# Patient Record
Sex: MALE | Race: BLACK OR AFRICAN AMERICAN | NOT HISPANIC OR LATINO | Employment: OTHER | URBAN - METROPOLITAN AREA
[De-identification: names, ages, dates, MRNs, and addresses within clinical notes are randomized per-mention and may not be internally consistent; named-entity substitution may affect disease eponyms.]

---

## 2022-11-05 PROBLEM — M25.572 ACUTE LEFT ANKLE PAIN: Status: ACTIVE | Noted: 2022-11-05

## 2023-12-19 ENCOUNTER — OFFICE VISIT (OUTPATIENT)
Dept: URGENT CARE | Facility: CLINIC | Age: 23
End: 2023-12-19
Payer: MEDICAID

## 2023-12-19 VITALS
RESPIRATION RATE: 19 BRPM | BODY MASS INDEX: 25.2 KG/M2 | DIASTOLIC BLOOD PRESSURE: 79 MMHG | OXYGEN SATURATION: 99 % | TEMPERATURE: 98 F | WEIGHT: 176 LBS | HEART RATE: 74 BPM | HEIGHT: 70 IN | SYSTOLIC BLOOD PRESSURE: 132 MMHG

## 2023-12-19 DIAGNOSIS — R21 PENILE RASH: Primary | ICD-10-CM

## 2023-12-19 LAB
C TRACH DNA SPEC QL NAA+PROBE: NOT DETECTED
HAV IGM SERPL QL IA: NORMAL
HBV CORE IGM SERPL QL IA: NORMAL
HBV SURFACE AG SERPL QL IA: NORMAL
HCV AB SERPL QL IA: NORMAL
HIV 1+2 AB+HIV1 P24 AG SERPL QL IA: NORMAL
N GONORRHOEA DNA SPEC QL NAA+PROBE: NOT DETECTED

## 2023-12-19 PROCEDURE — 87389 HIV-1 AG W/HIV-1&-2 AB AG IA: CPT

## 2023-12-19 PROCEDURE — 36415 COLL VENOUS BLD VENIPUNCTURE: CPT

## 2023-12-19 PROCEDURE — 80074 ACUTE HEPATITIS PANEL: CPT

## 2023-12-19 PROCEDURE — 86592 SYPHILIS TEST NON-TREP QUAL: CPT

## 2023-12-19 PROCEDURE — 87661 TRICHOMONAS VAGINALIS AMPLIF: CPT

## 2023-12-19 PROCEDURE — 87591 N.GONORRHOEAE DNA AMP PROB: CPT

## 2023-12-19 PROCEDURE — 99204 OFFICE O/P NEW MOD 45 MIN: CPT | Mod: S$GLB,,,

## 2023-12-19 PROCEDURE — 87491 CHLMYD TRACH DNA AMP PROBE: CPT

## 2023-12-19 PROCEDURE — 99204 PR OFFICE/OUTPT VISIT, NEW, LEVL IV, 45-59 MIN: ICD-10-PCS | Mod: S$GLB,,,

## 2023-12-19 NOTE — PATIENT INSTRUCTIONS
STD Screening  You were tested for STDs on today.  As far the STD testing, we will hold off on any medications until the labs result. We will phone in medication for you if needed.  If you were treated today, please take medication until completion unless told otherwise.  If you need more medication when the labs result, we will call you and phone them in for you.  If you do test positive for STDs you should be retested in about 3 months. Retest to ensure infection has cleared-there are infections that require more agressive treatment.   Increase condom use to prevent further occurance.    Notify sexual partners of the need for testing.    NO sexual intercourse for 7 days after treatment.   Today's testing will give no crediable information if you have unprotected sexual activities going forward.  Syphilis cases are rising!    Gonorrhea has RESISTANT strains which is why repeat testing after treatment is important.  Gonorrhea may be present in multiple sites from just ONE area of exposure.    For those who have high risk sexual behaviors and are on Truvada for PrEP- you have additional protection against HIV ONLY.    REMEMBER WEAR CONDOMS AND GET TESTED OFTEN.

## 2023-12-19 NOTE — PROGRESS NOTES
"Subjective:      Patient ID: Mart Floyd is a 23 y.o. male.    Vitals:  height is 5' 10" (1.778 m) and weight is 79.8 kg (176 lb). His temperature is 98.3 °F (36.8 °C). His blood pressure is 132/79 and his pulse is 74. His respiration is 19 and oxygen saturation is 99%.     Chief Complaint: Exposure to STD    Mart Floyd is a 23 y.o. male who presents for penile rash which onset 4 days ago. Patient denies any fever, chills, SOB, CP, abd pain, n/v/d. No discharge. No urinary changes. No flank pain. No hematuria. 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     Exposure to STD  The patient's primary symptoms include genital itching and genital lesions. The patient's pertinent negatives include no genital injury, pelvic pain, penile discharge, penile pain, priapism, scrotal swelling or testicular pain. This is a new problem. Episode onset: 5 days ago. The problem occurs constantly. The problem has been unchanged. The patient is experiencing no pain. Pertinent negatives include no abdominal pain, anorexia, chest pain, chills, constipation, coughing, diarrhea, discolored urine, dysuria, fever, flank pain, frequency, headaches, hematuria, hesitancy, joint pain, joint swelling, nausea, painful intercourse, rash, shortness of breath, sore throat, urgency, urinary retention or vomiting. Nothing aggravates the symptoms. He has tried nothing for the symptoms. The treatment provided no relief. He is sexually active. He inconsistently uses condoms. Yes, his partner has an STD. His past medical history is significant for gonorrhea. There is no history of BPH, chlamydia, cryptorchidism, erectile aid use, erectile dysfunction, a femoral hernia, herpes simplex, HIV, an inguinal hernia, kidney stones, prostatitis, sickle cell disease, syphilis or varicocele.       Constitution: Negative for chills and fever.   HENT:  Negative for sore throat.    Cardiovascular:  Negative for chest pain.   Respiratory:  Negative for cough and shortness of breath.  "   Gastrointestinal:  Negative for abdominal pain, nausea, vomiting, constipation and diarrhea.   Genitourinary:  Negative for dysuria, frequency, urgency, flank pain, penile discharge, penile pain, scrotal swelling, testicular pain and pelvic pain.   Skin:  Negative for rash.   Neurological:  Negative for headaches.      Objective:     Physical Exam   Constitutional: He is oriented to person, place, and time. He appears well-developed. No distress.   HENT:   Head: Normocephalic and atraumatic.   Ears:   Right Ear: External ear normal.   Left Ear: External ear normal.   Nose: Nose normal. No nasal deformity. No epistaxis.   Mouth/Throat: Oropharynx is clear and moist and mucous membranes are normal.   Eyes: Conjunctivae and lids are normal.   Neck: Trachea normal and phonation normal. Neck supple.   Cardiovascular: Normal rate, regular rhythm and normal heart sounds.   Pulmonary/Chest: Effort normal and breath sounds normal.   Abdominal: Normal appearance and bowel sounds are normal. He exhibits no distension. Soft. There is no abdominal tenderness.   Genitourinary:    Testes normal.   Uncircumcised. No penile tenderness. Penis exhibits no lesions. No discharge found.               Comments: Small flesh colored papule to penile shaft. No vesicles. No erythema. No discharge.      Musculoskeletal: Normal range of motion.         General: Normal range of motion.   Neurological: He is alert and oriented to person, place, and time. He has normal reflexes.   Skin: Skin is warm, dry, intact and not diaphoretic.   Psychiatric: His speech is normal and behavior is normal. Judgment and thought content normal.   Nursing note and vitals reviewed.chaperone present       Assessment:     1. Penile rash        Plan:       Penile rash  -     C. trachomatis/N. gonorrhoeae by AMP DNA [facility]; Urine  -     Trichomonas vaginalis, RNA, Qual, Urine  -     HIV 1/2 Ag/Ab (4th Gen); Future; Expected date: 12/19/2023  -     RPR; Future;  Expected date: 12/19/2023  -     HEPATITIS PANEL, ACUTE; Future; Expected date: 12/19/2023      Afebrile. VSS.  Rash not consistent with HSV.  NG/CT, trichomonas, HIV, RPR, and hepatitis panel ordered. Will notify patient with results in 3-5 days and tx as necessary.  Discussed HSV in length with patient.  Discussed importance of condoms and safe sex practices.  Discussed that he should not have sexual intercourse while being treated for STD and to use protection following treatment and culture results.   Patient verbalized understanding and has no further questions at this time. Discharge instructions reviewed with patient.   RTC as needed.

## 2023-12-20 LAB — RPR SER QL: NORMAL

## 2023-12-21 ENCOUNTER — TELEPHONE (OUTPATIENT)
Dept: URGENT CARE | Facility: CLINIC | Age: 23
End: 2023-12-21
Payer: MEDICAID

## 2023-12-21 LAB
SPECIMEN SOURCE: NORMAL
T VAGINALIS RRNA SPEC QL NAA+PROBE: NEGATIVE

## 2024-10-14 ENCOUNTER — TELEPHONE (OUTPATIENT)
Dept: PRIMARY CARE CLINIC | Facility: CLINIC | Age: 24
End: 2024-10-14
Payer: MEDICAID
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
## 2024-10-15 ENCOUNTER — OFFICE VISIT (OUTPATIENT)
Dept: PRIMARY CARE CLINIC | Facility: CLINIC | Age: 24
End: 2024-10-15
Payer: MEDICAID

## 2024-10-15 VITALS
SYSTOLIC BLOOD PRESSURE: 120 MMHG | HEART RATE: 82 BPM | OXYGEN SATURATION: 97 % | DIASTOLIC BLOOD PRESSURE: 88 MMHG | BODY MASS INDEX: 23.68 KG/M2 | WEIGHT: 165.38 LBS | TEMPERATURE: 98 F | HEIGHT: 70 IN

## 2024-10-15 DIAGNOSIS — M25.532 ACUTE PAIN OF LEFT WRIST: ICD-10-CM
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
## 2024-10-15 DIAGNOSIS — M25.571 ACUTE RIGHT ANKLE PAIN: ICD-10-CM

## 2024-10-15 DIAGNOSIS — S20.222D CONTUSION OF LEFT SIDE OF BACK, SUBSEQUENT ENCOUNTER: ICD-10-CM

## 2024-10-15 DIAGNOSIS — M25.561 ACUTE PAIN OF RIGHT KNEE: ICD-10-CM

## 2024-10-15 PROCEDURE — 99999 PR PBB SHADOW E&M-EST. PATIENT-LVL IV: CPT | Mod: PBBFAC,,, | Performed by: FAMILY MEDICINE

## 2024-10-15 PROCEDURE — 99214 OFFICE O/P EST MOD 30 MIN: CPT | Mod: PBBFAC,PN | Performed by: FAMILY MEDICINE

## 2024-10-15 RX ORDER — CYCLOBENZAPRINE HCL 10 MG
10 TABLET ORAL 3 TIMES DAILY
COMMUNITY
Start: 2024-10-13 | End: 2024-10-15 | Stop reason: SDUPTHER

## 2024-10-15 RX ORDER — CYCLOBENZAPRINE HCL 10 MG
10 TABLET ORAL 2 TIMES DAILY PRN
Qty: 60 TABLET | Refills: 0 | Status: SHIPPED | OUTPATIENT
Start: 2024-10-15

## 2024-10-15 RX ORDER — TIZANIDINE 2 MG/1
2 TABLET ORAL EVERY 6 HOURS PRN
COMMUNITY
Start: 2024-10-11 | End: 2024-10-15 | Stop reason: ALTCHOICE

## 2024-10-15 NOTE — LETTER
October 15, 2024      [facility] [place] - Primary Care  [address] [address]       Patient: Mart Floyd   YOB: 2000  Date of Visit: 10/15/2024    To Whom It May Concern:    Richie Floyd  was at [facility] on 10/15/2024. Please excuse patient 10/18/2024-10/20/2024. The patient may return to work/school on 10/21/2024 without restrictions. If you have any questions or concerns, or if I can be of further assistance, please do not hesitate to contact me.    Sincerely,    Aundrea Quigley MD
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
## 2024-10-15 NOTE — Clinical Note
October 15, 2024    Mart Floyd  [address] [place] [address]             [facility] [place] - Primary Care  Primary Care  [address]  [address]  [place] [address]   October 15, 2024     Patient: Mart Floyd   YOB: 2000   Date of Visit: 10/15/2024       To Whom it May Concern:    Mart Floyd was seen in my clinic on 10/15/2024. He {Return to school/sport/work:42577}.    Please excuse him from any classes or work missed.    If you have any questions or concerns, please don't hesitate to call.    Sincerely,         Aundrea Quigley MD

## 2024-10-15 NOTE — LETTER
October 15, 2024      [facility] [place] - Primary Care  [address] [address]       Patient: Mart Floyd   YOB: 2000  Date of Visit: 10/15/2024    To Whom It May Concern:    Richie Floyd  was at [facility] on 10/15/2024. The patient may return to work/school on 10/16/2024 without restrictions. If you have any questions or concerns, or if I can be of further assistance, please do not hesitate to contact me.    Sincerely,    Aundrea Quigley MD